# Patient Record
Sex: FEMALE | Race: WHITE | NOT HISPANIC OR LATINO | Employment: FULL TIME | URBAN - METROPOLITAN AREA
[De-identification: names, ages, dates, MRNs, and addresses within clinical notes are randomized per-mention and may not be internally consistent; named-entity substitution may affect disease eponyms.]

---

## 2017-06-07 ENCOUNTER — HOSPITAL ENCOUNTER (OUTPATIENT)
Dept: FAMILY MEDICINE CLINIC | Facility: CLINIC | Age: 22
Setting detail: SPECIMEN
Discharge: HOME | End: 2017-06-07
Attending: FAMILY MEDICINE | Admitting: FAMILY MEDICINE

## 2017-06-07 LAB
C TRACH RRNA SPEC QL PROBE: NORMAL
N GONORRHOEA RRNA SPEC QL PROBE: NORMAL
SPECIMEN SOURCE: NORMAL

## 2018-03-29 ENCOUNTER — HOSPITAL ENCOUNTER (OUTPATIENT)
Dept: FAMILY MEDICINE CLINIC | Facility: CLINIC | Age: 23
Setting detail: SPECIMEN
Discharge: HOME | End: 2018-03-29
Attending: FAMILY MEDICINE | Admitting: FAMILY MEDICINE

## 2019-07-11 RX ORDER — TETRACYCLINE HYDROCHLORIDE 250 MG/1
250 CAPSULE ORAL 2 TIMES DAILY
Qty: 60 CAPSULE | Refills: 1 | Status: SHIPPED | OUTPATIENT
Start: 2019-07-11 | End: 2019-07-22 | Stop reason: SDUPTHER

## 2019-07-22 ENCOUNTER — OFFICE VISIT (OUTPATIENT)
Dept: FAMILY MEDICINE CLINIC | Facility: CLINIC | Age: 24
End: 2019-07-22

## 2019-07-22 VITALS
WEIGHT: 131 LBS | HEIGHT: 70 IN | TEMPERATURE: 98.3 F | SYSTOLIC BLOOD PRESSURE: 119 MMHG | OXYGEN SATURATION: 97 % | BODY MASS INDEX: 18.75 KG/M2 | HEART RATE: 74 BPM | DIASTOLIC BLOOD PRESSURE: 77 MMHG

## 2019-07-22 DIAGNOSIS — F98.8 ATTENTION DEFICIT DISORDER (ADD) WITHOUT HYPERACTIVITY: ICD-10-CM

## 2019-07-22 DIAGNOSIS — L70.9 ACNE, UNSPECIFIED ACNE TYPE: Primary | ICD-10-CM

## 2019-07-22 PROBLEM — Z12.4 ENCOUNTER FOR SCREENING FOR MALIGNANT NEOPLASM OF CERVIX: Status: ACTIVE | Noted: 2017-06-07

## 2019-07-22 PROBLEM — Z00.00 ENCOUNTER FOR GENERAL ADULT MEDICAL EXAMINATION WITHOUT ABNORMAL FINDINGS: Status: ACTIVE | Noted: 2017-06-07

## 2019-07-22 PROCEDURE — 99213 OFFICE O/P EST LOW 20 MIN: CPT | Performed by: FAMILY MEDICINE

## 2019-07-22 RX ORDER — TETRACYCLINE HYDROCHLORIDE 250 MG/1
250 CAPSULE ORAL 2 TIMES DAILY
Qty: 60 CAPSULE | Refills: 6 | Status: SHIPPED | OUTPATIENT
Start: 2019-07-22

## 2019-07-22 RX ORDER — FLUOXETINE 10 MG/1
20 TABLET, FILM COATED ORAL DAILY
COMMUNITY
End: 2019-08-04 | Stop reason: DRUGHIGH

## 2019-07-22 RX ORDER — NORGESTIMATE AND ETHINYL ESTRADIOL 0.25-0.035
KIT ORAL EVERY 24 HOURS
COMMUNITY
Start: 2018-08-02 | End: 2019-08-10 | Stop reason: SDUPTHER

## 2019-07-22 RX ORDER — LORATADINE 10 MG/1
10 TABLET ORAL AS NEEDED
COMMUNITY

## 2019-07-22 NOTE — PROGRESS NOTES
Subjective   Amy Walker is a 23 y.o. female.     Here for yearly follow up on ADD  Recent prob with acne  No change in skin care  Started antibiotics a week  Benzoyl peroxide seemed to help some but cannot use it daily sec to dryness  Does not wear makeup often  prob started at [employer] when she was wearing more makeup  Her ins does not cover birth control  She is considering IUD - asking about cost  She will call OB/GYN office and explain situation and get cost estimate             The following portions of the patient's history were reviewed and updated as appropriate: allergies, current medications, past family history, past medical history, past social history, past surgical history and problem list.  Past Medical History:   Diagnosis Date   • ADHD    • IBS (irritable bowel syndrome)    • OCD (obsessive compulsive disorder)      Past Surgical History:   Procedure Laterality Date   • COLONOSCOPY     • WISDOM TOOTH EXTRACTION       Family History   Problem Relation Age of Onset   • Diabetes Other      Social History     Socioeconomic History   • Marital status: Single     Spouse name: Not on file   • Number of children: Not on file   • Years of education: Not on file   • Highest education level: Not on file   Tobacco Use   • Smoking status: Never Smoker   Substance and Sexual Activity   • Alcohol use: Yes     Frequency: Monthly or less   • Drug use: No         Current Outpatient Medications:   •  norgestimate-ethinyl estradiol (MONONESSA) 0.25-35 MG-MCG per tablet, Daily., Disp: , Rfl:   •  FLUoxetine (PROzac) 10 MG tablet, Take 20 mg by mouth Daily., Disp: , Rfl:   •  FLUoxetine (PROzac) 20 MG capsule, take 1 capsule by mouth once daily, Disp: , Rfl: 0  •  lisdexamfetamine (VYVANSE) 20 MG capsule, Take 1 capsule by mouth Daily, Disp: 30 capsule, Rfl: 0  •  loratadine (CLARITIN) 10 MG tablet, Take 10 mg by mouth As Needed., Disp: , Rfl:   •  tetracycline (ACHROMYCIN,SUMYCIN) 250 MG capsule, Take 1 capsule by  "mouth 2 (Two) Times a Day., Disp: 60 capsule, Rfl: 6    Review of Systems   Constitutional: Negative for diaphoresis, fatigue, fever, unexpected weight gain and unexpected weight loss.   Respiratory: Negative for cough, chest tightness and shortness of breath.    Cardiovascular: Negative for chest pain, palpitations and leg swelling.   Skin:        See HPI   Neurological: Negative for dizziness, syncope and headache.     /77 (BP Location: Right arm, Patient Position: Sitting, Cuff Size: Adult)   Pulse 74   Temp 98.3 °F (36.8 °C) (Oral)   Ht 177.8 cm (70\")   Wt 59.4 kg (131 lb)   SpO2 97%   BMI 18.80 kg/m²       Objective   Physical Exam   Constitutional: She appears well-developed and well-nourished. No distress.   HENT:   Head: Normocephalic and atraumatic.   Neck: Neck supple. No JVD present. No thyromegaly present.   Cardiovascular: Normal rate, regular rhythm, normal heart sounds and intact distal pulses. Exam reveals no gallop and no friction rub.   No murmur heard.  Pulmonary/Chest: Effort normal and breath sounds normal. No respiratory distress. She has no wheezes. She has no rales.   Musculoskeletal: She exhibits no edema.   Lymphadenopathy:     She has no cervical adenopathy.   Neurological: She is alert.   Skin: Skin is warm and dry. Rash (mild acne along the T zone) noted.   Psychiatric: She has a normal mood and affect.         Assessment/Plan   Problems Addressed this Visit        Musculoskeletal and Integument    Acne - Primary       Other    Attention deficit disorder (ADD) without hyperactivity    Relevant Medications    FLUoxetine (PROzac) 10 MG tablet                 "

## 2019-08-04 RX ORDER — FLUOXETINE HYDROCHLORIDE 20 MG/1
CAPSULE ORAL
Qty: 90 CAPSULE | Refills: 0 | Status: SHIPPED | OUTPATIENT
Start: 2019-08-04 | End: 2019-08-25 | Stop reason: SDUPTHER

## 2019-08-10 RX ORDER — NORGESTIMATE AND ETHINYL ESTRADIOL 0.25-0.035
KIT ORAL
Qty: 84 TABLET | Refills: 0 | Status: SHIPPED | OUTPATIENT
Start: 2019-08-10 | End: 2019-08-13

## 2019-08-25 RX ORDER — FLUOXETINE HYDROCHLORIDE 20 MG/1
CAPSULE ORAL
Qty: 90 CAPSULE | Refills: 0 | Status: SHIPPED | OUTPATIENT
Start: 2019-08-25

## 2020-05-29 ENCOUNTER — TELEPHONE (OUTPATIENT)
Dept: FAMILY MEDICINE CLINIC | Facility: CLINIC | Age: 25
End: 2020-05-29

## 2020-05-29 RX ORDER — TIZANIDINE HYDROCHLORIDE 4 MG/1
4 CAPSULE, GELATIN COATED ORAL 3 TIMES DAILY PRN
Qty: 30 CAPSULE | Refills: 0 | Status: SHIPPED | OUTPATIENT
Start: 2020-05-29 | End: 2020-07-07 | Stop reason: SDUPTHER

## 2020-05-29 NOTE — TELEPHONE ENCOUNTER
Pt called for her. Pt has a muscle spasm in her neck/back. Says that tylenol not helping and currently not able to get a message or needling. Asked if muscle relaxer can be called in.

## 2020-07-07 RX ORDER — TIZANIDINE HYDROCHLORIDE 4 MG/1
4 CAPSULE, GELATIN COATED ORAL 3 TIMES DAILY PRN
Qty: 30 CAPSULE | Refills: 0 | Status: SHIPPED | OUTPATIENT
Start: 2020-07-07 | End: 2020-08-14 | Stop reason: SDUPTHER

## 2020-07-07 NOTE — TELEPHONE ENCOUNTER
Pt mother called asking if pt can have another rx for muscle relaxer. She is only taking it nightly due to drowsiness.

## 2020-07-10 ENCOUNTER — TELEPHONE (OUTPATIENT)
Dept: FAMILY MEDICINE CLINIC | Facility: CLINIC | Age: 25
End: 2020-07-10

## 2020-07-10 RX ORDER — SPIRONOLACTONE 50 MG/1
50 TABLET, FILM COATED ORAL DAILY
Qty: 30 TABLET | Refills: 0 | Status: SHIPPED | OUTPATIENT
Start: 2020-07-10 | End: 2020-08-05 | Stop reason: SDUPTHER

## 2020-07-10 NOTE — TELEPHONE ENCOUNTER
Pt mother called for her. Pt does not currently have insurance, but will hopefully in a few weeks. Due to that she has not made an apt for dermatology for her cystic acne. She is asking if you can call in for spironolactone until she can see derm.

## 2020-08-05 RX ORDER — SPIRONOLACTONE 50 MG/1
50 TABLET, FILM COATED ORAL DAILY
Qty: 30 TABLET | Refills: 0 | Status: SHIPPED | OUTPATIENT
Start: 2020-08-05 | End: 2020-08-06

## 2020-08-06 RX ORDER — SPIRONOLACTONE 50 MG/1
TABLET, FILM COATED ORAL
Qty: 30 TABLET | Refills: 0 | Status: SHIPPED | OUTPATIENT
Start: 2020-08-06 | End: 2020-09-04 | Stop reason: SDUPTHER

## 2020-08-14 RX ORDER — TIZANIDINE HYDROCHLORIDE 4 MG/1
4 CAPSULE, GELATIN COATED ORAL 3 TIMES DAILY PRN
Qty: 30 CAPSULE | Refills: 0 | Status: SHIPPED | OUTPATIENT
Start: 2020-08-14 | End: 2020-09-10 | Stop reason: SDUPTHER

## 2020-09-01 ENCOUNTER — TELEPHONE (OUTPATIENT)
Dept: FAMILY MEDICINE CLINIC | Facility: CLINIC | Age: 25
End: 2020-09-01

## 2020-09-01 RX ORDER — PHENAZOPYRIDINE HYDROCHLORIDE 200 MG/1
200 TABLET, FILM COATED ORAL 3 TIMES DAILY PRN
Qty: 6 TABLET | Refills: 0 | Status: SHIPPED | OUTPATIENT
Start: 2020-09-01

## 2020-09-01 RX ORDER — NITROFURANTOIN 25; 75 MG/1; MG/1
100 CAPSULE ORAL 2 TIMES DAILY
Qty: 14 CAPSULE | Refills: 0 | Status: SHIPPED | OUTPATIENT
Start: 2020-09-01

## 2020-09-04 RX ORDER — SPIRONOLACTONE 50 MG/1
50 TABLET, FILM COATED ORAL DAILY
Qty: 90 TABLET | Refills: 0 | Status: SHIPPED | OUTPATIENT
Start: 2020-09-04 | End: 2020-09-18

## 2020-09-10 RX ORDER — TIZANIDINE HYDROCHLORIDE 4 MG/1
4 CAPSULE, GELATIN COATED ORAL 3 TIMES DAILY PRN
Qty: 30 CAPSULE | Refills: 0 | Status: SHIPPED | OUTPATIENT
Start: 2020-09-10

## 2020-09-18 ENCOUNTER — TELEPHONE (OUTPATIENT)
Dept: FAMILY MEDICINE CLINIC | Facility: CLINIC | Age: 25
End: 2020-09-18

## 2020-09-18 RX ORDER — CIPROFLOXACIN 500 MG/1
500 TABLET, FILM COATED ORAL 2 TIMES DAILY
Qty: 10 TABLET | Refills: 0 | Status: SHIPPED | OUTPATIENT
Start: 2020-09-18

## 2020-09-18 RX ORDER — SPIRONOLACTONE 100 MG/1
100 TABLET, FILM COATED ORAL DAILY
Qty: 30 TABLET | Refills: 0 | Status: SHIPPED | OUTPATIENT
Start: 2020-09-18 | End: 2020-10-08 | Stop reason: SDUPTHER

## 2020-09-18 NOTE — TELEPHONE ENCOUNTER
Pt mother called for her. UTI has returned. Asking if another rx can be sent in.  Also, she says that pt has been on spironolactone 50mg [id number] with no improvement. Asking if the next rx when she is due can be sent in for 100mg instead of 50mg.

## 2020-09-18 NOTE — TELEPHONE ENCOUNTER
I sent the new antibiotic and I sent in the 100 mg of Spironolactone  If her urinary tract infection is not better or does not resolve she is going to have to be seen  She needs to make an appointment to see the dermatologist  I know she was waiting till she got her insurance and hopefully she has that now

## 2020-10-08 RX ORDER — SPIRONOLACTONE 100 MG/1
100 TABLET, FILM COATED ORAL DAILY
Qty: 30 TABLET | Refills: 0 | Status: SHIPPED | OUTPATIENT
Start: 2020-10-08

## 2020-10-29 RX ORDER — SPIRONOLACTONE 50 MG/1
TABLET, FILM COATED ORAL
Qty: 90 TABLET | Refills: 0 | OUTPATIENT
Start: 2020-10-29